# Patient Record
Sex: MALE | HISPANIC OR LATINO
[De-identification: names, ages, dates, MRNs, and addresses within clinical notes are randomized per-mention and may not be internally consistent; named-entity substitution may affect disease eponyms.]

---

## 2024-09-09 PROBLEM — Z00.00 ENCOUNTER FOR PREVENTIVE HEALTH EXAMINATION: Status: ACTIVE | Noted: 2024-09-09

## 2024-09-11 PROBLEM — R97.20 ELEVATED PSA: Status: ACTIVE | Noted: 2024-09-11

## 2024-09-11 NOTE — PHYSICAL EXAM
[Normal Appearance] : normal appearance [Well Groomed] : well groomed [General Appearance - In No Acute Distress] : no acute distress [Edema] : no peripheral edema [Respiration, Rhythm And Depth] : normal respiratory rhythm and effort [Exaggerated Use Of Accessory Muscles For Inspiration] : no accessory muscle use [Abdomen Soft] : soft regular rate and rhythm [Abdomen Tenderness] : non-tender [Costovertebral Angle Tenderness] : no ~M costovertebral angle tenderness [Urinary Bladder Findings] : the bladder was normal on palpation [Normal Station and Gait] : the gait and station were normal for the patient's age [] : no rash [No Focal Deficits] : no focal deficits [Oriented To Time, Place, And Person] : oriented to person, place, and time [Affect] : the affect was normal [Mood] : the mood was normal [No Palpable Adenopathy] : no palpable adenopathy

## 2024-09-12 ENCOUNTER — APPOINTMENT (OUTPATIENT)
Dept: UROLOGY | Facility: CLINIC | Age: 69
End: 2024-09-12
Payer: MEDICARE

## 2024-09-12 VITALS
WEIGHT: 201 LBS | DIASTOLIC BLOOD PRESSURE: 74 MMHG | HEART RATE: 65 BPM | SYSTOLIC BLOOD PRESSURE: 144 MMHG | OXYGEN SATURATION: 97 %

## 2024-09-12 DIAGNOSIS — R97.20 ELEVATED PROSTATE, SPECIFIC ANTIGEN [PSA]: ICD-10-CM

## 2024-09-12 DIAGNOSIS — I10 ESSENTIAL (PRIMARY) HYPERTENSION: ICD-10-CM

## 2024-09-12 DIAGNOSIS — E78.00 PURE HYPERCHOLESTEROLEMIA, UNSPECIFIED: ICD-10-CM

## 2024-09-12 PROCEDURE — 99202 OFFICE O/P NEW SF 15 MIN: CPT

## 2024-09-12 RX ORDER — SODIUM PHOSPHATE, DIBASIC AND SODIUM PHOSPHATE, MONOBASIC, UNSPECIFIED FORM 7; 19 G/118ML; G/118ML
7-19 ENEMA RECTAL
Qty: 1 | Refills: 0 | Status: ACTIVE | COMMUNITY
Start: 2024-09-12 | End: 1900-01-01

## 2024-09-12 RX ORDER — AMLODIPINE BESYLATE 5 MG/1
TABLET ORAL
Refills: 0 | Status: ACTIVE | COMMUNITY

## 2024-09-12 RX ORDER — ATORVASTATIN CALCIUM 80 MG/1
TABLET, FILM COATED ORAL
Refills: 0 | Status: ACTIVE | COMMUNITY

## 2024-09-12 NOTE — ASSESSMENT
[FreeTextEntry1] : PSA has been consecutively elevated  Imagining is recommended with the elevated PSAs...MRI ordered for fusion biopsy.    Discussed transperineal fusion guided biopsy with the patient today. Explained to patient that the MRI images and transrectal ultrasound images allows us to retrieve biopsy samples from the lesion seen on the MRI. We will also take samples from a 12 core biopsy template of the prostate to assess for the presence of clinically significant cancer. Discussed the use of local anesthesia for this procedure, in addition to the option for a mild oral sedative. Reviewed the importance of a fleet enema the morning of the procedure. Discussed with patient that a transperineal approach has a low risk for infection. Discussed risks and benefits of a transperineal fusion biopsy.    The patient is aware to expect hematuria x2 weeks and up to 4 weeks of hematospermia. There is a risk of infection albeit much lower than transrectal approach. In some cases patients can experience erectile dysfunction but this is usually self-limiting. Any fever/chills after the biopsy the patient is contact the office and go to the ER for an immediate evaluation. Patient verbalized understanding of the procedure and instructions prior to his biopsy appointment.

## 2024-09-12 NOTE — REASON FOR VISIT
[Initial Visit ___] : [unfilled] is here today for an initial visit  for [unfilled] [Other: ______] : provided by EITAN [Interpreters_IDNumber] : [id number] [Interpreters_FullName] : Marlo [TWNoteComboBox1] : Eritrean

## 2024-09-12 NOTE — HISTORY OF PRESENT ILLNESS
[FreeTextEntry1] : JAI SUE  is a 69 year old Northern Irish speaking man who presents today for an elevated PSA. His PSA is 5.45ng/mL, drawn in August. Prior in March his PSA was 5.45 with 16% free fraction. He has not had a pelvic MRI nor a prostate biopsy.   His father had an enlarged prostate but not cancer.     He denies any bothersome urinary symptoms.   MRI History N/A   Biopsy History N/A   All pertinent laboratory, films and physician notes were reviewed.  Questionnaire results were discussed with patient.   Prostate cancer screening: the patient and I spoke at length about prostate cancer screening, its risks and its benefits. The patient has 2 (age, elevated PSA) risk factors for prostate cancer.  He understands that many men with prostate cancer will die with the disease rather than of it and we also discussed the results large multi-center American and  prostate cancer screening trials. He also understands that PSA in and of itself does not diagnose prostate cancer but only assesses risk to a certain degree.

## 2024-09-12 NOTE — HISTORY OF PRESENT ILLNESS
[FreeTextEntry1] : JAI SUE  is a 69 year old Citizen of [place] speaking man who presents today for an elevated PSA. His PSA is 5.45ng/mL, drawn in August. Prior in March his PSA was 5.45 with 16% free fraction. He has not had a pelvic MRI nor a prostate biopsy.   His father had an enlarged prostate but not cancer.     He denies any bothersome urinary symptoms.   MRI History N/A   Biopsy History N/A   All pertinent laboratory, films and physician notes were reviewed.  Questionnaire results were discussed with patient.   Prostate cancer screening: the patient and I spoke at length about prostate cancer screening, its risks and its benefits. The patient has 2 (age, elevated PSA) risk factors for prostate cancer.  He understands that many men with prostate cancer will die with the disease rather than of it and we also discussed the results large multi-center American and  prostate cancer screening trials. He also understands that PSA in and of itself does not diagnose prostate cancer but only assesses risk to a certain degree.

## 2024-09-12 NOTE — REASON FOR VISIT
[Initial Visit ___] : [unfilled] is here today for an initial visit  for [unfilled] [Other: ______] : provided by EITAN [Interpreters_IDNumber] : [id number] [Interpreters_FullName] : Marlo [TWNoteComboBox1] : [place] Islander

## 2024-09-27 ENCOUNTER — RESULT REVIEW (OUTPATIENT)
Age: 69
End: 2024-09-27

## 2024-09-27 ENCOUNTER — APPOINTMENT (OUTPATIENT)
Dept: MRI IMAGING | Facility: CLINIC | Age: 69
End: 2024-09-27

## 2024-09-27 ENCOUNTER — OUTPATIENT (OUTPATIENT)
Dept: OUTPATIENT SERVICES | Facility: HOSPITAL | Age: 69
LOS: 1 days | End: 2024-09-27
Payer: MEDICARE

## 2024-09-27 DIAGNOSIS — R97.20 ELEVATED PROSTATE SPECIFIC ANTIGEN [PSA]: ICD-10-CM

## 2024-09-27 PROCEDURE — 72197 MRI PELVIS W/O & W/DYE: CPT | Mod: 26

## 2024-09-27 PROCEDURE — 76498P: CUSTOM | Mod: 26

## 2024-09-27 PROCEDURE — 72197 MRI PELVIS W/O & W/DYE: CPT

## 2024-09-27 PROCEDURE — 76498 UNLISTED MR PROCEDURE: CPT

## 2024-09-27 PROCEDURE — A9585: CPT

## 2024-10-02 ENCOUNTER — NON-APPOINTMENT (OUTPATIENT)
Age: 69
End: 2024-10-02

## 2024-10-02 ENCOUNTER — APPOINTMENT (OUTPATIENT)
Dept: UROLOGY | Facility: CLINIC | Age: 69
End: 2024-10-02
Payer: MEDICARE

## 2024-10-02 VITALS
HEIGHT: 69 IN | DIASTOLIC BLOOD PRESSURE: 76 MMHG | SYSTOLIC BLOOD PRESSURE: 147 MMHG | BODY MASS INDEX: 29.62 KG/M2 | WEIGHT: 200 LBS | HEART RATE: 65 BPM | RESPIRATION RATE: 17 BRPM

## 2024-10-02 DIAGNOSIS — R97.20 ELEVATED PROSTATE, SPECIFIC ANTIGEN [PSA]: ICD-10-CM

## 2024-10-02 PROCEDURE — 99213 OFFICE O/P EST LOW 20 MIN: CPT

## 2024-10-02 NOTE — HISTORY OF PRESENT ILLNESS
[FreeTextEntry1] : JAI SUE presents to the office today. He is a 69 year old Polish speaking man referred through our prostate cancer rapid diagnostic pathway to evaluate a PSA elevation and abnormal findings on prostate MRI. The patient had a PSA level of 5.45ng/mL with 16% free fraction in August of this year. MRI of the prostate had been performed demonstrating a prostate of 91.4mL with no MRI targetable lesions, PSA density 0.06ng/mL.  The patient is now here today to consider prostate biopsy as the next step in management.   He denies any bothersome urinary symptoms. He has a strong stream.

## 2024-10-02 NOTE — HISTORY OF PRESENT ILLNESS
[FreeTextEntry1] : JAI SUE presents to the office today. He is a 69 year old Pashto speaking man referred through our prostate cancer rapid diagnostic pathway to evaluate a PSA elevation and abnormal findings on prostate MRI. The patient had a PSA level of 5.45ng/mL with 16% free fraction in August of this year. MRI of the prostate had been performed demonstrating a prostate of 91.4mL with no MRI targetable lesions, PSA density 0.06ng/mL.  The patient is now here today to consider prostate biopsy as the next step in management.   He denies any bothersome urinary symptoms. He has a strong stream.

## 2024-10-02 NOTE — REASON FOR VISIT
[Other: ______] : provided by EITAN [Interpreters_IDNumber] : [id number] [Interpreters_FullName] : Shefali [TWNoteComboBox1] : Nicaraguan

## 2024-10-02 NOTE — ASSESSMENT
[FreeTextEntry1] : Reviewed prostate MRI with patient, no lesions noted. low PSA density.  No biopsy at this time Prostate is enlarged at 91.5mL, patient has no bothersome urinary symptoms. Reviewed symptoms that may occur and reviewed medications that can be used. He is not interested in medication at this time.   Repeat PSA in one year.

## 2024-10-02 NOTE — LETTER BODY
[Dear  ___] : Dear  [unfilled], [Consult Letter:] : I had the pleasure of evaluating your patient, [unfilled]. [Please see my note below.] : Please see my note below. [Sincerely,] : Sincerely, [FreeTextEntry2] : Tasha Vázquez [address] [place] [id number] [FreeTextEntry3] : Isidra Nunez FNP-BC

## 2024-10-02 NOTE — LETTER BODY
[Dear  ___] : Dear  [unfilled], [Consult Letter:] : I had the pleasure of evaluating your patient, [unfilled]. [Please see my note below.] : Please see my note below. [Sincerely,] : Sincerely, [FreeTextEntry2] : Tasha Vázquez [address] [place] [id number] [FreeTextEntry3] : Isidra Nunez FNP-BC 1-2 cups/cans per day

## 2024-10-02 NOTE — REASON FOR VISIT
[Other: ______] : provided by EITAN [Interpreters_IDNumber] : [id number] [Interpreters_FullName] : Shefali [TWNoteComboBox1] : Marshallese

## 2024-10-08 ENCOUNTER — APPOINTMENT (OUTPATIENT)
Dept: UROLOGY | Facility: CLINIC | Age: 69
End: 2024-10-08

## 2024-10-10 ENCOUNTER — APPOINTMENT (OUTPATIENT)
Dept: UROLOGY | Facility: CLINIC | Age: 69
End: 2024-10-10

## 2024-10-18 ENCOUNTER — APPOINTMENT (OUTPATIENT)
Dept: UROLOGY | Facility: CLINIC | Age: 69
End: 2024-10-18